# Patient Record
(demographics unavailable — no encounter records)

---

## 2024-12-06 NOTE — ASSESSMENT
[FreeTextEntry1] : 60 year old woman with history of hypertension here for evaluation of erythrocytosis. Erythrocytosis in our EMR dates back to 7/2020 and HCT as high as 49.3. Negative for JAK2 V617F mutation. JAK2 mutations were not detected in exons 12, 13, 14 and 15. Erythropoietin level WNL. Patient reports she is up to date with her mammogram. She does not smoke. Patient underwent sleep study on May 14, 2024 which found mild sleep apnea.   CBC: WBC 7.9, Hgb 15.1, MCV 84.4, Plt 236k, diff WNL  Retic 1% Folate 3.1 (low). B12 831 WNL  Ferritin 45 WNL TIBC 276, iron sat 23%  Labs today show Hct is again 47%, elevated but stable. Workup thus far suggests a secondary erythrocytosis. Given that Hct values have remained stable, we recommend continued monitoring of Hct at this time. If values trend upward, then further workup can be potentially pursued of lesser common causes of erythrocytosis.   Mantle cell lymphoma: Dx with mantle cell lymphoma March 2023 on routine colonoscopy, s/p surgical resection. [place] [facility] recent follow up in Oct 2024 reportedly found no evidence of disease.   RTC in 6 months or earlier PRN  seen and d/w Dr. Chace Gomez MD PGY6  I discussed this patient in a pre-clinic session with the fellow including review of clinical status and last labs.  I also saw the patient and discussed history, completed an exam and discussed the plan together with the fellow.  Total time spent today on this patient   36   minutes.  This is a 60 year old woman with a history of elevated HCT. Unclear source, does not appear to be sleep apnea, the degree of apnea found was miniscule on sleep study. HCT remains mildly elevated, can continue observation.  Hx of mantel cell lymphoma found incidentally on colonoscopy. This was resected and is being followed at [facility] [place] [facility]. Does not rquire adjuvatn chemotehrapy or radiation.

## 2024-12-06 NOTE — HISTORY OF PRESENT ILLNESS
[de-identified] : Initial Consultation on 6/1/2022 Referred by: Dr. Kenney  HPI: 58 year old woman with history of hypertension here for evaluation of erythrocytosis. Recent CBC on 4/13/2022 showed WBC 7.39, HGB 15.2, HCT 49.3, MCV 86.9, RBC 5.67, RDW 15.6% and ,000. Erythrocytosis in our EMR dates back to 7/2020 and HCT as high as 49.3. Ferritin 78. Patient reports remote history of anemia secondary to fibroid bleeding; she underwent endometrial ablation in 2012. She reports that since 2012, her HGB/HCT had been rising. She denies a history of DVT/PE. She denies a history of smoking or known history of snoring. She denies any cardiac or pulmonary disease.  She feels good overall. She reports a good energy level and appetite with a few pounds of deliberate weight loss. Patient denies any fever/chills, recent infections, CP, SOB, palpitations, abdominal pain, n/v/d, bloody/black stools, frequent headaches, dizziness, pruritus/rash, erythromelalgia, night sweats or swollen glands.  PMHx: Hypertension Chronic venous insufficiency Vitamin D deficiency Squamous cell carcinoma on upper chest removed 1/2022 Fibroids  PSHx: Hysteroscopy with endometrial ablation 2012- s/p 2 units of pRBCs for severe anemia  Hospitalizations: Denies recent hospitalizations  Medications: See List. Medications reconciled  Allergies: NKDA  Social History: . Has 3 children Works as a nurse at [employer]. 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 Rare alcohol use [place] in [place] Parents [place] from [place]  Family History: Denies family history of anemia, leukemia, lymphoma or other blood disorders.  Healthcare Maintenance: Primary care doctor- Dr. Reid- last seen 10/2021 Last colonoscopy- 9/2020- polyps removed Last endoscopy- 9/2012- normal Last mammogram/breast ultrasound 9/2021. Denies h/o breast biopsies. Last gyn exam- 10/2021 Received two doses of COVID vaccine Denies known COVID illness- has positive antibodies for prior infection. [de-identified] : HCT 45.7% today.  No other explanations for the polycythemia, no heart disease No lung disease did not actually have sleep apnea.  Creatinine is fine therefore renal artery stenosis unlikely.  Recently had routine colonoscopy March 2024 which found a cyst arising from the appendix, resected and found to be mantle cell lymphoma. Saw Dr. Jarrett once but got [facility], now follows every 3 months at [facility]. 12/5/24: Patient had incidental USP identified from appendix protruding to the colon. Patient had CT, and underwent surgery. Per patient, there was no residual disease noted.  Patient visited [facility] in Oct for CT H/N CAP and bloodwork with no change. She is planned for next follow up in March 2025.  PET/CT also had identified non-FDG avid bilateral thyroid nodules. Patient. underwent US and biopsy from thyroid which found suspicious tissue, per patient.  She had complete thyroidectomy on 8/12/24, and path reportedly was negative. She is now on synthroid 175mg qdaily.  She denies family history of thyroid cancer. She had 9 lbs weight gain.  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  She denies constitutional symptoms. She also denies eryhthromelalgia.  For workup of erythrocytosis, patient underwent recommended sleep study which found mild KATHERIN with meghna O2 sat if 86% with 0.3% of time below 90%

## 2024-12-06 NOTE — ASSESSMENT
[FreeTextEntry1] : 60 year old woman with history of hypertension here for evaluation of erythrocytosis. Erythrocytosis in our EMR dates back to 7/2020 and HCT as high as 49.3. Negative for JAK2 V617F mutation. JAK2 mutations were not detected in exons 12, 13, 14 and 15. Erythropoietin level WNL. Patient reports she is up to date with her mammogram. She does not smoke. Patient underwent sleep study on May 14, 2024 which found mild sleep apnea.   CBC: WBC 7.9, Hgb 15.1, MCV 84.4, Plt 236k, diff WNL  Retic 1% Folate 3.1 (low). B12 831 WNL  Ferritin 45 WNL TIBC 276, iron sat 23%  Labs today show Hct is again 47%, elevated but stable. Workup thus far suggests a secondary erythrocytosis. Given that Hct values have remained stable, we recommend continued monitoring of Hct at this time. If values trend upward, then further workup can be potentially pursued of lesser common causes of erythrocytosis.   Mantle cell lymphoma: Dx with mantle cell lymphoma March 2023 on routine colonoscopy, s/p surgical resection. Patient has follow up at [facility], and recent follow up in Oct 2024 reportedly found no evidence of disease.   RTC in 6 months or earlier PRN  seen and d/w Dr. Chace Gomez MD PGY6  I discussed this patient in a pre-clinic session with the fellow including review of clinical status and last labs.  I also saw the patient and discussed history, completed an exam and discussed the plan together with the fellow.  Total time spent today on this patient   36   minutes.  This is a 60 year old woman with a history of elevated HCT. Unclear source, does not appear to be sleep apnea, the degree of apnea found was miniscule on sleep study. HCT remains mildly elevated, can continue observation.  Hx of mantel cell lymphoma found incidentally on colonoscopy. This was resected and is being followed at [facility]. Does not rquire adjuvatn chemotehrapy or radiation.

## 2024-12-06 NOTE — HISTORY OF PRESENT ILLNESS
[de-identified] : Initial Consultation on 6/1/2022 Referred by: Dr. Kenney  HPI: 58 year old woman with history of hypertension here for evaluation of erythrocytosis. Recent CBC on 4/13/2022 showed WBC 7.39, HGB 15.2, HCT 49.3, MCV 86.9, RBC 5.67, RDW 15.6% and ,000. Erythrocytosis in our EMR dates back to 7/2020 and HCT as high as 49.3. Ferritin 78. Patient reports remote history of anemia secondary to fibroid bleeding; she underwent endometrial ablation in 2012. She reports that since 2012, her HGB/HCT had been rising. She denies a history of DVT/PE. She denies a history of smoking or known history of snoring. She denies any cardiac or pulmonary disease.  She feels good overall. She reports a good energy level and appetite with a few pounds of deliberate weight loss. Patient denies any fever/chills, recent infections, CP, SOB, palpitations, abdominal pain, n/v/d, bloody/black stools, frequent headaches, dizziness, pruritus/rash, erythromelalgia, night sweats or swollen glands.  PMHx: Hypertension Chronic venous insufficiency Vitamin D deficiency Squamous cell carcinoma on upper chest removed 1/2022 Fibroids  PSHx: Hysteroscopy with endometrial ablation 2012- s/p 2 units of pRBCs for severe anemia  Hospitalizations: Denies recent hospitalizations  Medications: See List. Medications reconciled  Allergies: NKDA  Social History: . 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 Rare alcohol use [place] in [place] Parents are [place]  Family History: Denies family history of anemia, leukemia, lymphoma or other blood disorders.  Healthcare Maintenance: Primary care doctor- Dr. Reid- last seen 10/2021 Last colonoscopy- 9/2020- polyps removed Last endoscopy- 9/2012- normal Last mammogram/breast ultrasound 9/2021. Denies h/o breast biopsies. Last gyn exam- 10/2021 Received two doses of COVID vaccine Denies known COVID illness- has positive antibodies for prior infection. [de-identified] : HCT 45.7% today.  No other explanations for the polycythemia, no heart disease No lung disease did not actually have sleep apnea.  Creatinine is fine therefore renal artery stenosis unlikely.  Recently had routine colonoscopy March 2024 which found a cyst arising from the appendix, resected and found to be mantle cell lymphoma. Saw Dr. Jarrett once but got second opinion at [facility], now follows every 3 months at [facility]. 12/5/24: Patient had incidental group home identified from appendix protruding to the colon. Patient had CT, and underwent surgery. Per patient, there was no residual disease noted.  Patient visited [facility] in Oct for CT H/N CAP and bloodwork with no change. She is planned for next follow up in March 2025.  PET/CT also had identified non-FDG avid bilateral thyroid nodules. Patient. underwent US and biopsy from thyroid which found suspicious tissue, per patient.  She had complete thyroidectomy on 8/12/24, and path reportedly was negative. She is now on synthroid 175mg qdaily.  She denies family history of thyroid cancer. She had 9 lbs weight gain.  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 She also denies eryhthromelalgia.  For workup of erythrocytosis, patient underwent recommended sleep study which found mild KATHERIN with meghna O2 sat if 86% with 0.3% of time below 90%

## 2024-12-06 NOTE — ASSESSMENT
[FreeTextEntry1] : 60 year old woman with history of hypertension here for evaluation of erythrocytosis. Erythrocytosis in our EMR dates back to 7/2020 and HCT as high as 49.3. Negative for JAK2 V617F mutation. JAK2 mutations were not detected in exons 12, 13, 14 and 15. Erythropoietin level WNL. Patient reports she is up to date with her mammogram. She does not smoke. Patient underwent sleep study on May 14, 2024 which found mild sleep apnea.   CBC: WBC 7.9, Hgb 15.1, MCV 84.4, Plt 236k, diff WNL  Retic 1% Folate 3.1 (low). B12 831 WNL  Ferritin 45 WNL TIBC 276, iron sat 23%  Labs today show Hct is again 47%, elevated but stable. Workup thus far suggests a secondary erythrocytosis. Given that Hct values have remained stable, we recommend continued monitoring of Hct at this time. If values trend upward, then further workup can be potentially pursued of lesser common causes of erythrocytosis.   Mantle cell lymphoma: Dx with mantle cell lymphoma March 2023 on routine colonoscopy, s/p surgical resection. Patient [place] [facility], and recent follow up in Oct 2024 reportedly found no evidence of disease.   RTC in 6 months or earlier PRN  seen and d/w Dr. Chace Gomez MD PGY6  I discussed this patient in a pre-clinic session with the fellow including review of clinical status and last labs.  I also saw the patient and discussed history, completed an exam and discussed the plan together with the fellow.  Total time spent today on this patient   36   minutes.  This is a 60 year old woman with a history of elevated HCT. Unclear source, does not appear to be sleep apnea, the degree of apnea found was miniscule on sleep study. HCT remains mildly elevated, can continue observation.  Hx of mantel cell lymphoma found incidentally on colonoscopy. This was resected and [place] [facility] Does not rquire adjuvatn chemotehrapy or radiation.

## 2024-12-06 NOTE — REASON FOR VISIT
[Follow-Up Visit] : a follow-up visit for [Blood Count Assessment] : blood count assessment [FreeTextEntry2] : Polycythemia

## 2024-12-06 NOTE — HISTORY OF PRESENT ILLNESS
[de-identified] : Initial Consultation on 6/1/2022 Referred by: Dr. Kenney  HPI: 58 year old woman with history of hypertension here for evaluation of erythrocytosis. Recent CBC on 4/13/2022 showed WBC 7.39, HGB 15.2, HCT 49.3, MCV 86.9, RBC 5.67, RDW 15.6% and ,000. Erythrocytosis in our EMR dates back to 7/2020 and HCT as high as 49.3. Ferritin 78. Patient reports remote history of anemia secondary to fibroid bleeding; she underwent endometrial ablation in 2012. She reports that since 2012, her HGB/HCT had been rising. She denies a history of DVT/PE. She denies a history of smoking or known history of snoring. She denies any cardiac or pulmonary disease.  She feels good overall. She reports a good energy level and appetite with a few pounds of deliberate weight loss. Patient denies any fever/chills, recent infections, CP, SOB, palpitations, abdominal pain, n/v/d, bloody/black stools, frequent headaches, dizziness, pruritus/rash, erythromelalgia, night sweats or swollen glands.  PMHx: Hypertension Chronic venous insufficiency Vitamin D deficiency Squamous cell carcinoma on upper chest removed 1/2022 Fibroids  PSHx: Hysteroscopy with endometrial ablation 2012- s/p 2 units of pRBCs for severe anemia  Hospitalizations: Denies recent hospitalizations  Medications: See List. Medications reconciled  Allergies: NKDA  Social History: . 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 Rare alcohol use [place] in [place] Parents are [place]  Family History: Denies family history of anemia, leukemia, lymphoma or other blood disorders.  Healthcare Maintenance: Primary care doctor- Dr. Reid- last seen 10/2021 Last colonoscopy- 9/2020- polyps removed Last endoscopy- 9/2012- normal Last mammogram/breast ultrasound 9/2021. Denies h/o breast biopsies. Last gyn exam- 10/2021 Received two doses of COVID vaccine Denies known COVID illness- has positive antibodies for prior infection. [de-identified] : HCT 45.7% today.  No other explanations for the polycythemia, no heart disease No lung disease did not actually have sleep apnea.  Creatinine is fine therefore renal artery stenosis unlikely.  Recently had routine colonoscopy March 2024 which found a cyst arising from the appendix, resected and found to be mantle cell lymphoma. Saw Dr. Jarrett once but got second opinion at [facility], now follows every 3 months at [facility]. 12/5/24: Patient had incidental FCI identified from appendix protruding to the colon. Patient had CT, and underwent surgery. Per patient, there was no residual disease noted.  Patient visited [facility] in Oct for CT H/N CAP and bloodwork with no change. She is planned for next follow up in March 2025.  PET/CT also had identified non-FDG avid bilateral thyroid nodules. Patient. underwent US and biopsy from thyroid which found suspicious tissue, per patient.  She had complete thyroidectomy on 8/12/24, and path reportedly was negative. She is now on synthroid 175mg qdaily.  She denies family history of thyroid cancer. She had 9 lbs weight gain.  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 She also denies eryhthromelalgia.  For workup of erythrocytosis, patient underwent recommended sleep study which found mild KATHERIN with meghna O2 sat if 86% with 0.3% of time below 90%

## 2025-01-15 NOTE — HEALTH RISK ASSESSMENT
[Good] : ~his/her~  mood as  good [No] : No [No falls in past year] : Patient reported no falls in the past year [0] : 2) Feeling down, depressed, or hopeless: Not at all (0) [Never] : Never [With Significant Other] : lives with significant other [Employed] : employed [College] : College [] :  [# Of Children ___] : has [unfilled] children [Fully functional (bathing, dressing, toileting, transferring, walking, feeding)] : Fully functional (bathing, dressing, toileting, transferring, walking, feeding) [Fully functional (using the telephone, shopping, preparing meals, housekeeping, doing laundry, using] : Fully functional and needs no help or supervision to perform IADLs (using the telephone, shopping, preparing meals, housekeeping, doing laundry, using transportation, managing medications and managing finances) [Smoke Detector] : smoke detector [Carbon Monoxide Detector] : carbon monoxide detector [Seat Belt] :  uses seat belt [Sunscreen] : uses sunscreen [Designated Healthcare Proxy] : Designated healthcare proxy [Name: ___] : Health Care Proxy's Name: [unfilled]  [Relationship: ___] : Relationship: [unfilled] [BoneDensityComments] : Dr. Phillips [de-identified] : exercises 4-5 days per week [[id number]] : 0 [Change in mental status noted] : No change in mental status noted [Language] : denies difficulty with language [Behavior] : denies difficulty with behavior [Handling Complex Tasks] : denies difficulty handling complex tasks [Reasoning] : denies difficulty with reasoning [Reports changes in hearing] : Reports no changes in hearing [Reports changes in vision] : Reports no changes in vision [Reports changes in dental health] : Reports no changes in dental health [MammogramDate] : 12/3/2024 [MammogramComments] : Dr. Tena [PapSmearDate] : 1/13/2025 [PapSmearComments] : Dr. Phillips [BoneDensityDate] : 12/3/2024 [ColonoscopyDate] : 2024 [ColonoscopyComments] : Dr. 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

## 2025-01-15 NOTE — HISTORY OF PRESENT ILLNESS
[FreeTextEntry1] : The patient is a 60 year  old female who presents for annual physical examination. [de-identified] : Patient is a 60-year-old female with past medical history significant for Mantle cell lymphoma (3/2024), thyroid nodules status post complete thyroidectomy (8/2024), erythrocytosis, mild KATHERIN, obesity, Patient remains under the care of of Dr. Nubia Lagunas at [facility] for history of mantle cell lymphoma. Undergoing blood work every 3 months and imaging with blood work every 6 months at [facility].   States she also continues to follow-up with endocrine oncologist at [facility]- Dr. Lonnie Kowalski ([place]).  Compliant with Levothyroixone 175mcg daily Up-to-date with skin cancer screening performed by her dermatologist yesterday. Up-to-date with mammogram, GYN exam, and bone density.  Up-to-date with colonoscopy. Struggles to lose weight.  Admits she has not been compliant with diet.  Continues to exercise regularly. Offers no new complaints.

## 2025-01-15 NOTE — HEALTH RISK ASSESSMENT
[Good] : ~his/her~  mood as  good [No] : No [No falls in past year] : Patient reported no falls in the past year [0] : 2) Feeling down, depressed, or hopeless: Not at all (0) [Never] : Never [With Significant Other] : lives with significant other [Employed] : employed [College] : College [] :  [# Of Children ___] : has [unfilled] children [Fully functional (bathing, dressing, toileting, transferring, walking, feeding)] : Fully functional (bathing, dressing, toileting, transferring, walking, feeding) [Fully functional (using the telephone, shopping, preparing meals, housekeeping, doing laundry, using] : Fully functional and needs no help or supervision to perform IADLs (using the telephone, shopping, preparing meals, housekeeping, doing laundry, using transportation, managing medications and managing finances) [Smoke Detector] : smoke detector [Carbon Monoxide Detector] : carbon monoxide detector [Seat Belt] :  uses seat belt [Sunscreen] : uses sunscreen [Designated Healthcare Proxy] : Designated healthcare proxy [Name: ___] : Health Care Proxy's Name: [unfilled]  [Relationship: ___] : Relationship: [unfilled] [BoneDensityComments] : Dr. Phillips [de-identified] : exercises 4-5 days per week [[id number]] : 0 [Change in mental status noted] : No change in mental status noted [Language] : denies difficulty with language [Behavior] : denies difficulty with behavior [Handling Complex Tasks] : denies difficulty handling complex tasks [Reasoning] : denies difficulty with reasoning [Reports changes in hearing] : Reports no changes in hearing [Reports changes in vision] : Reports no changes in vision [Reports changes in dental health] : Reports no changes in dental health [MammogramDate] : 12/3/2024 [MammogramComments] : Dr. Tena [PapSmearDate] : 1/13/2025 [PapSmearComments] : Dr. Phillips [BoneDensityDate] : 12/3/2024 [ColonoscopyDate] : 2024 [ColonoscopyComments] : Dr. 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

## 2025-01-15 NOTE — HISTORY OF PRESENT ILLNESS
[FreeTextEntry1] : The patient is a 60 year  old female who presents for annual physical examination. [de-identified] : Patient is a 60-year-old female with past medical history significant for Mantle cell lymphoma (3/2024), thyroid nodules status post complete thyroidectomy (8/2024), erythrocytosis, mild KATHERIN, obesity, Patient remains under the care of of Dr. Nubia Lagunas at [facility] for history of mantle cell lymphoma. Undergoing blood work every 3 months and imaging with blood work every 6 months at [facility].   States she also continues to follow-up with endocrine oncologist at [facility]- Dr. Lonnie Kowalski ([place]).  Compliant with Levothyroixone 175mcg daily Up-to-date with skin cancer screening performed by her dermatologist yesterday. Up-to-date with mammogram, GYN exam, and bone density.  Up-to-date with colonoscopy. Struggles to lose weight.  Admits she has not been compliant with diet.  Continues to exercise regularly. Offers no new complaints.

## 2025-01-15 NOTE — PHYSICAL EXAM
[No Acute Distress] : no acute distress [Well Nourished] : well nourished [Well Developed] : well developed [Well-Appearing] : well-appearing [Normal Sclera/Conjunctiva] : normal sclera/conjunctiva [PERRL] : pupils equal round and reactive to light [EOMI] : extraocular movements intact [Normal Outer Ear/Nose] : the outer ears and nose were normal in appearance [Normal Oropharynx] : the oropharynx was normal [Normal TMs] : both tympanic membranes were normal [No JVD] : no jugular venous distention [No Lymphadenopathy] : no lymphadenopathy [Supple] : supple [Thyroid Normal, No Nodules] : the thyroid was normal and there were no nodules present [No Respiratory Distress] : no respiratory distress  [No Accessory Muscle Use] : no accessory muscle use [Clear to Auscultation] : lungs were clear to auscultation bilaterally [Normal Rate] : normal rate  [Regular Rhythm] : with a regular rhythm [Normal S1, S2] : normal S1 and S2 [No Murmur] : no murmur heard [No Carotid Bruits] : no carotid bruits [No Varicosities] : no varicosities [Pedal Pulses Present] : the pedal pulses are present [No Edema] : there was no peripheral edema [No Extremity Clubbing/Cyanosis] : no extremity clubbing/cyanosis [Normal Appearance] : normal in appearance [No Nipple Discharge] : no nipple discharge [No Axillary Lymphadenopathy] : no axillary lymphadenopathy [Soft] : abdomen soft [Non Tender] : non-tender [Non-distended] : non-distended [No Masses] : no abdominal mass palpated [No HSM] : no HSM [Normal Bowel Sounds] : normal bowel sounds [Normal Posterior Cervical Nodes] : no posterior cervical lymphadenopathy [Normal Anterior Cervical Nodes] : no anterior cervical lymphadenopathy [No Joint Swelling] : no joint swelling [Grossly Normal Strength/Tone] : grossly normal strength/tone [No Rash] : no rash [Coordination Grossly Intact] : coordination grossly intact [No Focal Deficits] : no focal deficits [Normal Affect] : the affect was normal [Alert and Oriented x3] : oriented to person, place, and time [Normal Insight/Judgement] : insight and judgment were intact [de-identified] : obese [de-identified] : Soft tissue mass/cyst palpable over left shin.

## 2025-01-15 NOTE — PHYSICAL EXAM
[No Acute Distress] : no acute distress [Well Nourished] : well nourished [Well Developed] : well developed [Well-Appearing] : well-appearing [Normal Sclera/Conjunctiva] : normal sclera/conjunctiva [PERRL] : pupils equal round and reactive to light [EOMI] : extraocular movements intact [Normal Outer Ear/Nose] : the outer ears and nose were normal in appearance [Normal Oropharynx] : the oropharynx was normal [Normal TMs] : both tympanic membranes were normal [No JVD] : no jugular venous distention [No Lymphadenopathy] : no lymphadenopathy [Supple] : supple [Thyroid Normal, No Nodules] : the thyroid was normal and there were no nodules present [No Respiratory Distress] : no respiratory distress  [No Accessory Muscle Use] : no accessory muscle use [Clear to Auscultation] : lungs were clear to auscultation bilaterally [Normal Rate] : normal rate  [Regular Rhythm] : with a regular rhythm [Normal S1, S2] : normal S1 and S2 [No Murmur] : no murmur heard [No Carotid Bruits] : no carotid bruits [No Varicosities] : no varicosities [Pedal Pulses Present] : the pedal pulses are present [No Edema] : there was no peripheral edema [No Extremity Clubbing/Cyanosis] : no extremity clubbing/cyanosis [Normal Appearance] : normal in appearance [No Nipple Discharge] : no nipple discharge [No Axillary Lymphadenopathy] : no axillary lymphadenopathy [Soft] : abdomen soft [Non Tender] : non-tender [Non-distended] : non-distended [No Masses] : no abdominal mass palpated [No HSM] : no HSM [Normal Bowel Sounds] : normal bowel sounds [Normal Posterior Cervical Nodes] : no posterior cervical lymphadenopathy [Normal Anterior Cervical Nodes] : no anterior cervical lymphadenopathy [No Joint Swelling] : no joint swelling [Grossly Normal Strength/Tone] : grossly normal strength/tone [No Rash] : no rash [Coordination Grossly Intact] : coordination grossly intact [No Focal Deficits] : no focal deficits [Normal Affect] : the affect was normal [Alert and Oriented x3] : oriented to person, place, and time [Normal Insight/Judgement] : insight and judgment were intact [de-identified] : obese [de-identified] : Soft tissue mass/cyst palpable over left shin.

## 2025-03-12 NOTE — ASSESSMENT
[FreeTextEntry1] : Impression and plan Patient came to the office today to arrange for colonoscopy.  Last year there was a lesion near the appendix which turned out to be mantle cell lymphoma.  The patient is undergoing ongoing treatment at [facility].  She has a CT scan and blood work scheduled for the near future.  I asked her to send these results to me.  I will schedule colonoscopy at her convenience the risks benefits alternatives and limitation were discussed.  Further suggestions will follow.

## 2025-03-12 NOTE — HISTORY OF PRESENT ILLNESS
[FreeTextEntry1] : Patient returns for follow-up and discussion.  Last year the patient underwent colonoscopy and a mantle cell lymphoma was discovered near the appendix.  This was surgically removed and patient is undergoing therapy at [facility].  The patient has had no new GI complaints.  She denies nausea vomiting fever chills rectal bleeding or melena.  She follows closely with primary care physician.  Past medical history was reviewed.

## 2025-06-20 NOTE — ASSESSMENT
[FreeTextEntry1] : 61 year old woman with history of hypertension here for evaluation of erythrocytosis. Erythrocytosis in our EMR dates back to 7/2020 and HCT as high as 49.3. Negative for JAK2 V617F mutation. JAK2 mutations were not detected in exons 12, 13, 14 and 15. Erythropoietin level WNL.  Given no evidence f primary MPN ,we elected to follow Q 6 moths. HCT 45.7% today ,barely abnormal will follow up in 6 months by telehealth again.

## 2025-06-20 NOTE — HISTORY OF PRESENT ILLNESS
[de-identified] : Patient twit secondary erythrocytosis though the primary cause is not obvious.  BETTY 2 negative. Patient with HCT 45.7% HCT 47.8% today really mid range for her 44-49%.  No other explanations for the polycythemia, no heart disease No lung disease did not actually have sleep apnea.  Creatinine is fine therefore renal artery stenosis unlikely.    Patient feels well and has no complaints.   Has not had any systemic complaints.  Patient has not been tested for sleep apnea.